# Patient Record
Sex: MALE | Race: WHITE | NOT HISPANIC OR LATINO | URBAN - METROPOLITAN AREA
[De-identification: names, ages, dates, MRNs, and addresses within clinical notes are randomized per-mention and may not be internally consistent; named-entity substitution may affect disease eponyms.]

---
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
## 2025-02-01 ENCOUNTER — HOSPITAL ENCOUNTER (EMERGENCY)
Facility: HOSPITAL | Age: 25
Discharge: HOME | End: 2025-02-01
Attending: STUDENT IN AN ORGANIZED HEALTH CARE EDUCATION/TRAINING PROGRAM | Admitting: STUDENT IN AN ORGANIZED HEALTH CARE EDUCATION/TRAINING PROGRAM
Payer: COMMERCIAL

## 2025-02-01 VITALS
DIASTOLIC BLOOD PRESSURE: 73 MMHG | RESPIRATION RATE: 18 BRPM | BODY MASS INDEX: 21.69 KG/M2 | TEMPERATURE: 98.2 F | HEART RATE: 105 BPM | WEIGHT: 135 LBS | HEIGHT: 66 IN | OXYGEN SATURATION: 96 % | SYSTOLIC BLOOD PRESSURE: 114 MMHG

## 2025-02-01 DIAGNOSIS — L50.9 URTICARIA: ICD-10-CM

## 2025-02-01 PROCEDURE — 99284 EMERGENCY DEPT VISIT MOD MDM: CPT | Mod: 25

## 2025-02-01 PROCEDURE — 96374 THER/PROPH/DIAG INJ IV PUSH: CPT

## 2025-02-01 PROCEDURE — 96375 TX/PRO/DX INJ NEW DRUG ADDON: CPT

## 2025-02-01 PROCEDURE — 250N000011 HC RX IP 250 OP 636: Performed by: STUDENT IN AN ORGANIZED HEALTH CARE EDUCATION/TRAINING PROGRAM

## 2025-02-01 RX ORDER — PREDNISONE 20 MG/1
TABLET ORAL
Qty: 10 TABLET | Refills: 0 | Status: SHIPPED | OUTPATIENT
Start: 2025-02-01

## 2025-02-01 RX ORDER — METHYLPREDNISOLONE SODIUM SUCCINATE 125 MG/2ML
125 INJECTION INTRAMUSCULAR; INTRAVENOUS ONCE
Status: COMPLETED | OUTPATIENT
Start: 2025-02-01 | End: 2025-02-01

## 2025-02-01 RX ORDER — DIPHENHYDRAMINE HYDROCHLORIDE 50 MG/ML
50 INJECTION INTRAMUSCULAR; INTRAVENOUS ONCE
Status: COMPLETED | OUTPATIENT
Start: 2025-02-01 | End: 2025-02-01

## 2025-02-01 RX ORDER — EPINEPHRINE 0.3 MG/.3ML
0.3 INJECTION SUBCUTANEOUS
Qty: 2 EACH | Refills: 0 | Status: SHIPPED | OUTPATIENT
Start: 2025-02-01

## 2025-02-01 RX ADMIN — DIPHENHYDRAMINE HYDROCHLORIDE 50 MG: 50 INJECTION, SOLUTION INTRAMUSCULAR; INTRAVENOUS at 21:15

## 2025-02-01 RX ADMIN — METHYLPREDNISOLONE SODIUM SUCCINATE 125 MG: 125 INJECTION, POWDER, FOR SOLUTION INTRAMUSCULAR; INTRAVENOUS at 21:16

## 2025-02-01 ASSESSMENT — COLUMBIA-SUICIDE SEVERITY RATING SCALE - C-SSRS
6. HAVE YOU EVER DONE ANYTHING, STARTED TO DO ANYTHING, OR PREPARED TO DO ANYTHING TO END YOUR LIFE?: NO
1. IN THE PAST MONTH, HAVE YOU WISHED YOU WERE DEAD OR WISHED YOU COULD GO TO SLEEP AND NOT WAKE UP?: NO
2. HAVE YOU ACTUALLY HAD ANY THOUGHTS OF KILLING YOURSELF IN THE PAST MONTH?: NO

## 2025-02-02 NOTE — ED TRIAGE NOTES
Patient presents to ED for allergic reaction.  Started having hives at about 8:15 this evening on face.  Benadryl lotion placed on face, but now hives have spread to shoulders.  No difficulty breathing and denies any swelling in mouth or tongue.  States has been drinking alcohol this evening.         Triage Assessment (Adult)       Row Name 02/01/25 2053          Triage Assessment    Airway WDL WDL        Respiratory WDL    Respiratory WDL WDL        Skin Circulation/Temperature WDL    Skin Circulation/Temperature WDL WDL        Cardiac WDL    Cardiac WDL WDL        Peripheral/Neurovascular WDL    Peripheral Neurovascular WDL WDL        Cognitive/Neuro/Behavioral WDL    Cognitive/Neuro/Behavioral WDL WDL

## 2025-02-02 NOTE — ED PROVIDER NOTES
EMERGENCY DEPARTMENT ENCOUNTER      NAME: Jean Pierre Kathleen  AGE: 24 year old male  YOB: 2000  MRN: [id number]  EVALUATION DATE & TIME: No admission date for patient encounter.    PCP: Loy Calloway    ED PROVIDER: Justus Amos MD      Chief Complaint   Patient presents with    Allergic Reaction         FINAL IMPRESSION:  1. Urticaria          ED COURSE & MEDICAL DECISION MAKING:    Pertinent Labs & Imaging studies reviewed. (See chart for details)  24 year old male presents to the Emergency Department for evaluation of hives    ED Course as of 02/01/25 2228   Sat Feb 01, 2025 2103 I met with the patient to obtain patient history and performed a physical exam. Discussed plan for ED work up including potential diagnostic studies and interventions.   2112 Patient is a 24-year-old male who is previously healthy and presents to the emergency department with hives.  Symptoms began approximately 1 hour prior to arrival with hives on his face, which she states he gets from time to time, and typically resolves with Benadryl lotion.  This time after using the lotion his symptoms continue to worsen and spread to his trunk.  Fortunately he does not have evidence of anaphylaxis, as there are no other systems involved, and specifically no airway complaints, and he has clear breath sounds.  It is unclear what caused his hives, but I discussed with him that there are many reasons why hives may originate, ranging from allergic reaction to stress to viral illness, among others. No new foods, drinks, medicines. Will treat with Benadryl, steroids, and monitor to assess for progression of illness, and if he shows stability, then will discharge with allergist referral and continued use of antihistamines and steroids for a short-term period.   2228 Patient's rash is significantly improved, will discharge at this time with return precautions and a prescription for prednisone burst, EpiPen, and allergist referral.        Medical Decision Making  Obtained supplemental history:Supplemental history obtained?: No  Reviewed external records: External records reviewed?: Documented in chart  Care impacted by chronic illness:Documented in Chart  Care significantly affected by social determinants of health:Access to Medical Care  Did you consider but not order tests?: Work up considered but not performed and documented in chart, if applicable  Did you interpret images independently?: Independent interpretation of ECG and images noted in documentation, when applicable.  Consultation discussion with other provider:Did you involve another provider (consultant, , pharmacy, etc.)?: No  Discharge. I prescribed additional prescription strength medication(s) as charted. See documentation for any additional details.  Not Applicable      At the conclusion of the encounter I discussed the results of all of the tests and the disposition. The questions were answered. The patient or family acknowledged understanding and was agreeable with the care plan.     0 minutes of critical care time     MEDICATIONS GIVEN IN THE EMERGENCY:  Medications   methylPREDNISolone Na Suc (solu-MEDROL) injection 125 mg (125 mg Intravenous $Given 2/1/25 2116)   diphenhydrAMINE (BENADRYL) injection 50 mg (50 mg Intravenous $Given 2/1/25 2115)       NEW PRESCRIPTIONS STARTED AT TODAY'S ER VISIT  New Prescriptions    EPINEPHRINE (ANY BX GENERIC EQUIV) 0.3 MG/0.3ML INJECTION 2-PACK    Inject 0.3 mLs (0.3 mg) into the muscle once as needed for anaphylaxis. May repeat one time in 5-15 minutes if response to initial dose is inadequate.    PREDNISONE (DELTASONE) 20 MG TABLET    Take two tablets (= 40mg) each day for 5 (five) days          =================================================================    HPI    Patient information was obtained from: Patient    Use of : N/A        Jean Pierre Kathleen is a 24 year old male with no documented pertinent history who  "presents to this ED via private car for evaluation of hives. Patient reports sudden onset of hives all over his face that started an hour ago. He applied benadryl cream, but the hives then spread onto his shoulders and states this is the worst he has seen it happen. Patient has these hives in the past and it usually gets better after applying the benadryl cream, but this time it didn't. Patient confirms drinking rum prior to when the hives started. He has drunk rum in the past with no issues like this before. Patient denies all other symptoms of shortness of breath, nausea, vomiting, abdominal pain, swelling of lips, difficulty eating/drinking, and any new medications.      PAST MEDICAL HISTORY:  History reviewed. No pertinent past medical history.    PAST SURGICAL HISTORY:  History reviewed. No pertinent surgical history.        CURRENT MEDICATIONS:    predniSONE (DELTASONE) 20 MG tablet  EPINEPHrine (ANY BX GENERIC EQUIV) 0.3 MG/0.3ML injection 2-pack        ALLERGIES:  No Known Allergies    FAMILY HISTORY:  Family History   Problem Relation Age of Onset    Alcoholism No family hx of     Substance Abuse No family hx of     Heart Disease No family hx of     Hyperlipidemia No family hx of     Hypertension No family hx of        SOCIAL HISTORY:   Social History     Socioeconomic History    Marital status: Single   Tobacco Use    Smoking status: Never    Smokeless tobacco: Current   Substance and Sexual Activity    Alcohol use: No    Drug use: No       VITALS:  /77   Pulse 100   Temp 98.2  F (36.8  C) (Oral)   Resp 16   Ht 1.676 m (5' 6\")   Wt 61.2 kg (135 lb)   SpO2 95%   BMI 21.79 kg/m      PHYSICAL EXAM    Physical Exam  Vitals and nursing note reviewed.   Constitutional:       General: He is not in acute distress.     Appearance: Normal appearance. He is normal weight. He is not ill-appearing.   HENT:      Head: Normocephalic and atraumatic.      Nose: Nose normal.      Mouth/Throat:      Mouth: " Mucous membranes are moist.      Pharynx: Oropharynx is clear.      Comments: No swelling of the lips or tongue or posterior oropharynx  Eyes:      Extraocular Movements: Extraocular movements intact.      Conjunctiva/sclera: Conjunctivae normal.      Pupils: Pupils are equal, round, and reactive to light.   Cardiovascular:      Rate and Rhythm: Normal rate and regular rhythm.      Pulses: Normal pulses.      Heart sounds: Normal heart sounds. No murmur heard.  Pulmonary:      Effort: Pulmonary effort is normal. No respiratory distress.      Breath sounds: Normal breath sounds. No wheezing.   Abdominal:      General: Abdomen is flat. There is no distension.      Palpations: Abdomen is soft.      Tenderness: There is no abdominal tenderness.   Musculoskeletal:         General: No swelling. Normal range of motion.      Cervical back: Normal range of motion.      Right lower leg: No edema.      Left lower leg: No edema.   Skin:     General: Skin is warm and dry.      Capillary Refill: Capillary refill takes less than 2 seconds.      Findings: Rash (urticaria on face and trunk; no swelling) present.   Neurological:      General: No focal deficit present.      Mental Status: He is alert and oriented to person, place, and time. Mental status is at baseline.   Psychiatric:         Mood and Affect: Mood normal.         Behavior: Behavior normal.         Thought Content: Thought content normal.         Judgment: Judgment normal.            LAB:  All pertinent labs reviewed and interpreted.       RADIOLOGY:  Reviewed all pertinent imaging. Please see official radiology report.  No orders to display       PROCEDURES:   None      [id number] Documentation:   CMS Diagnoses:              ICarlos, am serving as a scribe to document services personally performed by Justus Amos MD based on my observation and the provider's statements to me. I, Justus Amos MD, attest that Carlos Cortes is acting in  a scribe capacity, has observed my performance of the services and has documented them in accordance with my direction.    Justus Amos MD  [facility] EMERGENCY DEPARTMENT  [facility] [facility] [id number] [phone]     Justus Amos MD  02/01/25 6302

## 2025-02-02 NOTE — DISCHARGE INSTRUCTIONS
If your rash persists or worsens, you should use an antihistamine.  Benadryl is useful at nighttime, as it can make you drowsy, but during the daytime you may want to consider cetirizine.  Both of which can be obtained over-the-counter.    We referred you to an allergist for further testing.  Otherwise use the prescribed medications.    If you develop difficulty breathing, swelling of the lips, tongue, or abdominal pain or nausea/vomiting, then you need to use the EpiPen that was prescribed and return to the emergency department immediately after by calling 911.

## 2025-02-15 ENCOUNTER — HEALTH MAINTENANCE LETTER (OUTPATIENT)
Age: 25
End: 2025-02-15

## 2025-05-13 ENCOUNTER — OFFICE VISIT (OUTPATIENT)
Dept: ALLERGY | Facility: CLINIC | Age: 25
End: 2025-05-13
Attending: STUDENT IN AN ORGANIZED HEALTH CARE EDUCATION/TRAINING PROGRAM
Payer: COMMERCIAL

## 2025-05-13 VITALS — WEIGHT: 150 LBS | OXYGEN SATURATION: 98 % | BODY MASS INDEX: 24.11 KG/M2 | HEART RATE: 83 BPM | HEIGHT: 66 IN

## 2025-05-13 DIAGNOSIS — L50.9 URTICARIA: ICD-10-CM

## 2025-05-13 PROCEDURE — 99203 OFFICE O/P NEW LOW 30 MIN: CPT

## 2025-05-13 NOTE — PROGRESS NOTES
Jean Pierre Kathleen was seen in the Allergy Clinic at [facility].    Jean Pierre Kathleen is a 25 year old male  being seen today for ongoing evaluation of urticaria.  Referral from,     Justus Amos MD [facility] EMERGENCY DEPT       HPI:  Onset: Jan. 2025 Feb 2025 more widespread -he went to the ER as it had been more significant than the past.    Any illness, stressors or vaccines prior to onset: Patient reports work has been a little bit more stressful than in the past.  Duration: Once monthly, 30 minutes -typically they happen when he is drinking alcohol.  Patient reports he has been unable to drink alcohol without any skin changes/concerns or flushing  Location: Typically on his face, 1 time it happened on his chest and upper extremities  Aggravating factors: Denies exercise, tight fitting clothes, pressure, heat/exercise/hot showers, lack of sleep, spicy foods.   Do they wake up in the morning with hives: No  Family history of chronic urticaria/angioedema: No  Autoimmune diease or thyroid dysfunction: No  Unintended weight loss: No  New/Changes of Medications: No   NSAID/ASA use: does not use, has not used when he has been drinking alcohol.  Dermatology providers or skin biopsy: No    Patient reports he has tried Benadryl cream, possible improvement while using this product.    The patient describes hives as mildly pruritic, circumscribed, raised, erythematous plaques, often with central pallor. He denies unintentional weight loss, recurrent fevers, or arthralgias. The skin lesions do not last on the same spot for more than 24 hours. They do not leave any bruising or discoloration.  The patient also denies having difficulty breathing, or swelling/tingling sensation of his throat/lips or tongue, vomiting or diarrhea at that time.  Patient had photos on his cell phone with hives on his face, neck, upper extremities, and chest.    Patient denies he took prescription of  prednisone that was prescribed to him in the ER. Patient denies having experienced angioedema.      On a thorough history, I was unable to find any specific contact, food, airborne, or medication reaction that would be associated with his hives.    No past medical history on file.    No past surgical history on file.      Current Outpatient Medications:     EPINEPHrine (ANY BX GENERIC EQUIV) 0.3 MG/0.3ML injection 2-pack, Inject 0.3 mLs (0.3 mg) into the muscle once as needed for anaphylaxis. May repeat one time in 5-15 minutes if response to initial dose is inadequate., Disp: 2 each, Rfl: 0    predniSONE (DELTASONE) 20 MG tablet, Take two tablets (= 40mg) each day for 5 (five) days, Disp: 10 tablet, Rfl: 0  No Known Allergies        Peq New Allergy And Asthma    Question 5/12/2025 10:14 AM CDT - Filed by Patient   Reason for visit: Hives   Hives    When did symptoms begin? Jan 1, 2025   How often do you have symptoms? Monthly   Where do the hives appear? Face    Neck    Back   How long do the hives last? 30 minutes or more   Do the hives leave any bruises or marks? No   Any new medications or changes in dosage? No   Recent infections? No   Increased stress? Yes   Any family members with similar symptoms? No   Any known triggers?    Any medications taken for these symptoms? No   Environmental and Social History    Place of Residence: [facility]   Do you have Central Air Conditioning? Yes   Type of Heating System: Forced air   Wood burning stove or fireplace: No   Occupation:    Pets: Yes   Number and type of pets: 1 hypoallergenic dog, several reptiles   Do you smoke cigarettes: No   Do you use an e-cigarette or vape? Yes   Does anyone living in your home smoke or vape? Yes   Family History    Do your parents, siblings, or children have asthma? No   Do your parents, siblings, or children have environmental allergies? No   Do your parents, siblings, or children have food allergies? No   Do your  parents, siblings, or children have eczema? No          Family History   Problem Relation Age of Onset    Alcoholism No family hx of     Substance Abuse No family hx of     Heart Disease No family hx of     Hyperlipidemia No family hx of     Hypertension No family hx of        EXAM:   There were no vitals taken for this visit.    Physical Exam  Skin:     Comments: Patient denies have any skin finding or hives during today's appointment.         WORKUP:     ASSESSMENT/PLAN:  Jean Pierre Kathleen is a 25 year old male with intermittent urticaria.    Intermittent urticaria  Patient reports urticaria has been triggered by alcohol use and stress.  During today's appointment we discussed that some patients do experience flushing when they drink alcohol, patient reports he used to drink more when he was in college and had never experienced any flushing or skin changes prior to January 2025.  Patient denies family history of alcohol intolerance/flushing/alcohol dehydrogenase deficiency.    We discussed avoiding alcohol may improve patient's symptoms.  We discussed that there is not a diagnostic study to identify what triggered intermittent urticaria, that is inducible with use of alcohol/stress.  We discussed there is also not a lab available to identify how long this will last.      We discussed if patient would like to pretreat he may use cetirizine, up to 4 tablets daily and famotidine, up to 2 tablets daily prior to exposure to alcohol.    Cetrizine (Zyrtec) 1-2 tablets in the morning, 1-2 tablets in the evening.  A maximum of 4 tablets daily can be used.  Please use the least amount of antihistamines possible to control hives/breakthrough hives.    Famotidine (Pepcid) 20 mg, 1 tablet in the morning, 1 tablet in the evening.      We reviewed some potential laboratory testing if patient were to develop chronic urticaria.  These tests are typically not indicated unless patients have hives at least once a week for greater than 6  weeks.    Patient was instructed to send me a [employer] message or schedule follow-up if his hives progressed or untreated with the above listed treatment plan.    We discussed if patient does develop chronic urticaria and fails this treatment plan, he may need to see MD allergist for further evaluation.    Follow-up as needed.    Thank you for allowing me to participate in the care of Jean Pierre Kathleen.    I spent 40 minutes on the date of the encounter doing chart review, history and exam, documentation and further coordination as noted above exclusive of separately reported interpretations.       Please note that this note consists of symbols derived from keyboarding, dictation and/or voice recognition software. As a result, there may be errors in the script that have gone undetected. Please consider this when interpreting information found in this chart.     Caprice Matos PA-C  [facility]

## 2025-05-13 NOTE — LETTER
5/13/2025      Jean Pierre Kathleen  [address] [place] [address] [address] [place] [address]      Dear Colleague,    Thank you for referring your patient, Jean Pierre Kathleen, to the [facility]. Please see a copy of my visit note below.    Jean Pierre Kathleen was seen in the Allergy Clinic at [facility].    Jean Pierre Kathleen is a 25 year old male  being seen today for ongoing evaluation of urticaria.  Referral from,     Justus Amos MD [facility] EMERGENCY DEPT       HPI:  Onset: Jan. 2025 Feb 2025 more widespread -he went to the ER as it had been more significant than the past.    Any illness, stressors or vaccines prior to onset: Patient reports work has been a little bit more stressful than in the past.  Duration: Once monthly, 30 minutes -typically they happen when he is drinking alcohol.  Patient reports he has been unable to drink alcohol without any skin changes/concerns or flushing  Location: Typically on his face, 1 time it happened on his chest and upper extremities  Aggravating factors: Denies exercise, tight fitting clothes, pressure, heat/exercise/hot showers, lack of sleep, spicy foods.   Do they wake up in the morning with hives: No  Family history of chronic urticaria/angioedema: No  Autoimmune diease or thyroid dysfunction: No  Unintended weight loss: No  New/Changes of Medications: No   NSAID/ASA use: does not use, has not used when he has been drinking alcohol.  Dermatology providers or skin biopsy: No    Patient reports he has tried Benadryl cream, possible improvement while using this product.    The patient describes hives as mildly pruritic, circumscribed, raised, erythematous plaques, often with central pallor. He denies unintentional weight loss, recurrent fevers, or arthralgias. The skin lesions do not last on the same spot for more than 24 hours. They do not leave any bruising or discoloration.  The patient also denies having difficulty breathing,  or swelling/tingling sensation of his throat/lips or tongue, vomiting or diarrhea at that time.  Patient had photos on his cell phone with hives on his face, neck, upper extremities, and chest.    Patient denies he took prescription of prednisone that was prescribed to him in the ER. Patient denies having experienced angioedema.      On a thorough history, I was unable to find any specific contact, food, airborne, or medication reaction that would be associated with his hives.    No past medical history on file.    No past surgical history on file.      Current Outpatient Medications:      EPINEPHrine (ANY BX GENERIC EQUIV) 0.3 MG/0.3ML injection 2-pack, Inject 0.3 mLs (0.3 mg) into the muscle once as needed for anaphylaxis. May repeat one time in 5-15 minutes if response to initial dose is inadequate., Disp: 2 each, Rfl: 0     predniSONE (DELTASONE) 20 MG tablet, Take two tablets (= 40mg) each day for 5 (five) days, Disp: 10 tablet, Rfl: 0  No Known Allergies        Peq New Allergy And Asthma    Question 5/12/2025 10:14 AM CDT - Filed by Patient   Reason for visit: Hives   Hives    When did symptoms begin? Jan 1, 2025   How often do you have symptoms? Monthly   Where do the hives appear? Face    Neck    Back   How long do the hives last? 30 minutes or more   Do the hives leave any bruises or marks? No   Any new medications or changes in dosage? No   Recent infections? No   Increased stress? Yes   Any family members with similar symptoms? No   Any known triggers?    Any medications taken for these symptoms? No   Environmental and Social History    Place of Residence: [facility]   Do you have Central Air Conditioning? Yes   Type of Heating System: Forced air   Wood burning stove or fireplace: No   Occupation:    Pets: Yes   Number and type of pets: 1 hypoallergenic dog, several reptiles   Do you smoke cigarettes: No   Do you use an e-cigarette or vape? Yes   Does anyone living in your home smoke or  vape? Yes   Family History    Do your parents, siblings, or children have asthma? No   Do your parents, siblings, or children have environmental allergies? No   Do your parents, siblings, or children have food allergies? No   Do your parents, siblings, or children have eczema? No          Family History   Problem Relation Age of Onset     Alcoholism No family hx of      Substance Abuse No family hx of      Heart Disease No family hx of      Hyperlipidemia No family hx of      Hypertension No family hx of        EXAM:   There were no vitals taken for this visit.    Physical Exam  Skin:     Comments: Patient denies have any skin finding or hives during today's appointment.         WORKUP:     ASSESSMENT/PLAN:  Jean Pierre Kathleen is a 25 year old male with intermittent urticaria.    Intermittent urticaria  Patient reports urticaria has been triggered by alcohol use and stress.  During today's appointment we discussed that some patients do experience flushing when they drink alcohol, patient reports he used to drink more when he was in college and had never experienced any flushing or skin changes prior to January 2025.  Patient denies family history of alcohol intolerance/flushing/alcohol dehydrogenase deficiency.    We discussed avoiding alcohol may improve patient's symptoms.  We discussed that there is not a diagnostic study to identify what triggered intermittent urticaria, that is inducible with use of alcohol/stress.  We discussed there is also not a lab available to identify how long this will last.      We discussed if patient would like to pretreat he may use cetirizine, up to 4 tablets daily and famotidine, up to 2 tablets daily prior to exposure to alcohol.    Cetrizine (Zyrtec) 1-2 tablets in the morning, 1-2 tablets in the evening.  A maximum of 4 tablets daily can be used.  Please use the least amount of antihistamines possible to control hives/breakthrough hives.    Famotidine (Pepcid) 20 mg, 1 tablet in the  morning, 1 tablet in the evening.      We reviewed some potential laboratory testing if patient were to develop chronic urticaria.  These tests are typically not indicated unless patients have hives at least once a week for greater than 6 weeks.    Patient was instructed to send me a [employer] message or schedule follow-up if his hives progressed or untreated with the above listed treatment plan.    We discussed if patient does develop chronic urticaria and fails this treatment plan, he may need to see MD allergist for further evaluation.    Follow-up as needed.    Thank you for allowing me to participate in the care of Jean Pierre Kathleen.    I spent 40 minutes on the date of the encounter doing chart review, history and exam, documentation and further coordination as noted above exclusive of separately reported interpretations.       Please note that this note consists of symbols derived from keyboarding, dictation and/or voice recognition software. As a result, there may be errors in the script that have gone undetected. Please consider this when interpreting information found in this chart.     Caprice Matos PA-C  [facility]    Again, thank you for allowing me to participate in the care of your patient.        Sincerely,        Caprice Matos PA-C    Electronically signed